# Patient Record
Sex: FEMALE | Race: WHITE | NOT HISPANIC OR LATINO | Employment: PART TIME | URBAN - METROPOLITAN AREA
[De-identification: names, ages, dates, MRNs, and addresses within clinical notes are randomized per-mention and may not be internally consistent; named-entity substitution may affect disease eponyms.]

---

## 2019-08-31 ENCOUNTER — OFFICE VISIT (OUTPATIENT)
Dept: URGENT CARE | Facility: PHYSICIAN GROUP | Age: 19
End: 2019-08-31
Payer: COMMERCIAL

## 2019-08-31 VITALS
RESPIRATION RATE: 15 BRPM | HEIGHT: 65 IN | TEMPERATURE: 98.3 F | HEART RATE: 94 BPM | WEIGHT: 138 LBS | BODY MASS INDEX: 22.99 KG/M2 | SYSTOLIC BLOOD PRESSURE: 104 MMHG | OXYGEN SATURATION: 95 % | DIASTOLIC BLOOD PRESSURE: 72 MMHG

## 2019-08-31 DIAGNOSIS — J36 PERITONSILLAR CELLULITIS: ICD-10-CM

## 2019-08-31 DIAGNOSIS — J03.90 TONSILLITIS: ICD-10-CM

## 2019-08-31 LAB
HETEROPH AB SER QL LA: NEGATIVE
INT CON NEG: NEGATIVE
INT CON NEG: NEGATIVE
INT CON POS: POSITIVE
INT CON POS: POSITIVE
S PYO AG THROAT QL: NEGATIVE

## 2019-08-31 PROCEDURE — 86308 HETEROPHILE ANTIBODY SCREEN: CPT | Performed by: EMERGENCY MEDICINE

## 2019-08-31 PROCEDURE — 99203 OFFICE O/P NEW LOW 30 MIN: CPT | Performed by: EMERGENCY MEDICINE

## 2019-08-31 PROCEDURE — 87880 STREP A ASSAY W/OPTIC: CPT | Performed by: EMERGENCY MEDICINE

## 2019-08-31 RX ORDER — AMOXICILLIN AND CLAVULANATE POTASSIUM 875; 125 MG/1; MG/1
1 TABLET, FILM COATED ORAL 2 TIMES DAILY
Qty: 14 TAB | Refills: 0 | Status: SHIPPED | OUTPATIENT
Start: 2019-08-31 | End: 2019-09-07

## 2019-08-31 RX ORDER — DEXAMETHASONE 4 MG/1
10 TABLET ORAL ONCE
Qty: 3 TAB | Refills: 0 | Status: SHIPPED | OUTPATIENT
Start: 2019-08-31 | End: 2019-08-31 | Stop reason: SDUPTHER

## 2019-08-31 RX ORDER — AMOXICILLIN 500 MG/1
CAPSULE ORAL
Refills: 0 | COMMUNITY
Start: 2019-08-29

## 2019-08-31 RX ORDER — DEXAMETHASONE 4 MG/1
10 TABLET ORAL ONCE
Qty: 3 TAB | Refills: 0 | Status: SHIPPED | OUTPATIENT
Start: 2019-08-31 | End: 2019-08-31

## 2019-08-31 RX ORDER — DEXAMETHASONE 0.5 MG/1
TABLET ORAL
Refills: 0 | COMMUNITY
Start: 2019-08-29

## 2019-08-31 ASSESSMENT — ENCOUNTER SYMPTOMS
HEADACHES: 0
ABDOMINAL PAIN: 0
MYALGIAS: 1
COUGH: 0
DIARRHEA: 0
SWOLLEN GLANDS: 1
SHORTNESS OF BREATH: 0
VOMITING: 0

## 2019-08-31 NOTE — PATIENT INSTRUCTIONS
Peritonsillar Cellulitis  Introduction  Peritonsillar cellulitis is an infection around a tonsil that results in a severe sore throat. If the condition is not treated, pus can collect in the throat.  What are the causes?  Peritonsillar cellulitis is usually caused by a combination of several strains of bacteria.  What increases the risk?  This condition is more likely to develop in:  · People who have frequent tonsil infections.  · People who take antibiotic medicines frequently.  · People who smoke.  What are the signs or symptoms?  Early symptoms of this condition include:  · A fever.  · Chills.  · Soreness on one side of the throat.  · Pain in one ear.  · Pain when swallowing.  · Tiredness.  Later symptoms include:  · Severe pain when swallowing.  · Drooling.  · Trouble opening the mouth wide.  · Bad breath.  · Changes in the voice.  How is this diagnosed?  This condition may be diagnosed based on symptoms, a physical exam, and a test in which a sample of fluid from the throat is tested (throat culture). You may also have a blood test.  How is this treated?  This condition is usually treated with antibiotic medicines. You may need to take these medicines by mouth or through an IV tube. Additional treatment may include:  · Medicines for pain, fever, or swelling. Some medicines may be given through an IV tube.  · A procedure to drain a collection of pus (abscess).  · Surgery to remove the tonsils (tonsillectomy). This may be done if you get this condition often.  Follow these instructions at home:  Eating and drinking  · If it is hard to swallow, try switching to a liquid or soft-food diet until you get better.  · Drink enough fluid to keep your urine clear or pale yellow.  Medicines  · Take your antibiotic medicine as told by your health care provider. Do not stop taking the antibiotic even if you start to feel better.  · Take over-the-counter and prescription medicines only as told by your health care  provider.  Activity  · Rest and get plenty of sleep.  · Return to work or school as directed by your health care provider.  General instructions  · Do not smoke.  · Keep all follow-up visits as told by your health care provider. This is important.  · To help ease pain and swelling, gargle with a salt-water mixture 3-4 times per day or as needed. To make a salt-water mixture, completely dissolve ½-1 tsp of salt in 1 cup of warm water.  Contact a health care provider if:  · Your swelling gets worse.  · You have difficulty swallowing.  · You are unable to take your antibiotic.  · You have a fever that does not improve after you take medicine.  · Your voice changes.  Get help right away if:  · You have trouble breathing.  · Your pain gets worse.  · You see pus around or near your tonsils.  · You cough up bloody spit.  · You are unable to swallow.  · You are drooling.  This information is not intended to replace advice given to you by your health care provider. Make sure you discuss any questions you have with your health care provider.  Document Released: 03/14/2011 Document Revised: 05/25/2017 Document Reviewed: 12/14/2015  © 2017 [employer]

## 2019-08-31 NOTE — PROGRESS NOTES
"Subjective:      Karoline Echols is a 19 y.o. female who presents with Pharyngitis (dx with strep on thursday)            Pharyngitis    This is a new problem. Episode onset: 3 days. The problem has been gradually worsening. The pain is worse on the left side. Maximum temperature: subjective. The pain is moderate. Associated symptoms include swollen glands. Pertinent negatives include no abdominal pain, congestion, coughing, diarrhea, ear pain, headaches, shortness of breath or vomiting. She has had no exposure to strep or mono. She has tried gargles, cool liquids and NSAIDs (amoxicillin, dexamethasone) for the symptoms. The treatment provided no relief.   PMH strep throat.  No strep test done at prior  visit; Rx empirically.    Review of Systems   Constitutional: Positive for malaise/fatigue.   HENT: Negative for congestion, ear pain and nosebleeds.    Respiratory: Negative for cough and shortness of breath.    Gastrointestinal: Negative for abdominal pain, diarrhea and vomiting.   Musculoskeletal: Positive for myalgias.   Neurological: Negative for headaches.   Endo/Heme/Allergies: Negative for environmental allergies.     PMH:  has no past medical history on file.  MEDS:   Current Outpatient Medications:   •  amoxicillin (AMOXIL) 500 MG Cap, , Disp: , Rfl: 0  •  dexamethasone (DECADRON) 0.5 MG Tab, , Disp: , Rfl: 0  ALLERGIES: Not on File  SURGHX: History reviewed. No pertinent surgical history.  SOCHX:  reports that she has never smoked. She has never used smokeless tobacco.  FH: family history is not on file.     Objective:     /72   Pulse 94   Temp 36.8 °C (98.3 °F)   Resp 15   Ht 1.651 m (5' 5\")   Wt 62.6 kg (138 lb)   SpO2 95%   BMI 22.96 kg/m²      Physical Exam   Constitutional: She is oriented to person, place, and time. She appears well-developed and well-nourished. She is cooperative.  Non-toxic appearance. No distress.   HENT:   Head: Normocephalic.   Right Ear: Tympanic membrane " and ear canal normal.   Left Ear: Tympanic membrane and ear canal normal.   Nose: No mucosal edema or rhinorrhea.   Mouth/Throat: No trismus in the jaw. No uvula swelling. No oropharyngeal exudate, posterior oropharyngeal edema, posterior oropharyngeal erythema or tonsillar abscesses. Tonsils are 1+ on the right. Tonsils are 3+ on the left. Tonsillar exudate.       Eyes: Conjunctivae are normal.   Neck: Trachea normal. Neck supple.   Cardiovascular: Normal rate, regular rhythm and normal heart sounds.   Pulmonary/Chest: Effort normal and breath sounds normal.   Lymphadenopathy:     She has cervical adenopathy.        Right cervical: Superficial cervical adenopathy present. No posterior cervical adenopathy present.       Left cervical: Superficial cervical adenopathy present. No posterior cervical adenopathy present.   Neurological: She is alert and oriented to person, place, and time.   Skin: Skin is warm and dry.   Psychiatric: She has a normal mood and affect.          Advised ED evaluation if any worsening localized sore throat, difficulty breathing or swallowing.  Advised of limitations of Monospot testing; consider repeat testing if symptoms not resolving after week.     Assessment/Plan:     1. Peritonsillar cellulitis  Rx Augmentin, dexamethasone  Recommended supportive care measures, including rest, increasing oral fluid intake and use of over-the-counter medications for relief of symptoms.    2. Tonsillitis  negative- POCT Rapid Strep A  negative- POCT Mononucleosis (mono)